# Patient Record
Sex: MALE | Race: ASIAN | NOT HISPANIC OR LATINO | URBAN - METROPOLITAN AREA
[De-identification: names, ages, dates, MRNs, and addresses within clinical notes are randomized per-mention and may not be internally consistent; named-entity substitution may affect disease eponyms.]

---

## 2020-02-10 ENCOUNTER — EMERGENCY (EMERGENCY)
Facility: HOSPITAL | Age: 60
LOS: 0 days | Discharge: HOME | End: 2020-02-10
Attending: EMERGENCY MEDICINE
Payer: MEDICAID

## 2020-02-10 VITALS
RESPIRATION RATE: 18 BRPM | HEIGHT: 66 IN | DIASTOLIC BLOOD PRESSURE: 51 MMHG | SYSTOLIC BLOOD PRESSURE: 118 MMHG | WEIGHT: 212.08 LBS | TEMPERATURE: 98 F | OXYGEN SATURATION: 99 % | HEART RATE: 88 BPM

## 2020-02-10 VITALS
OXYGEN SATURATION: 99 % | TEMPERATURE: 98 F | DIASTOLIC BLOOD PRESSURE: 63 MMHG | HEART RATE: 81 BPM | SYSTOLIC BLOOD PRESSURE: 117 MMHG | RESPIRATION RATE: 16 BRPM

## 2020-02-10 DIAGNOSIS — M79.671 PAIN IN RIGHT FOOT: ICD-10-CM

## 2020-02-10 PROCEDURE — 99283 EMERGENCY DEPT VISIT LOW MDM: CPT

## 2020-02-10 PROCEDURE — 73630 X-RAY EXAM OF FOOT: CPT | Mod: 26,RT

## 2020-02-10 RX ORDER — TRAMADOL HYDROCHLORIDE 50 MG/1
50 TABLET ORAL ONCE
Refills: 0 | Status: DISCONTINUED | OUTPATIENT
Start: 2020-02-10 | End: 2020-02-10

## 2020-02-10 RX ORDER — TRAMADOL HYDROCHLORIDE 50 MG/1
1 TABLET ORAL
Qty: 12 | Refills: 0
Start: 2020-02-10 | End: 2020-02-12

## 2020-02-10 RX ADMIN — TRAMADOL HYDROCHLORIDE 50 MILLIGRAM(S): 50 TABLET ORAL at 08:53

## 2020-02-10 NOTE — ED PROVIDER NOTE - RESPIRATORY NEGATIVE STATEMENT, MLM
Received pharmacy request for refill of primidone. No upcoming appt scheduled. Last visit 5/30/18 with instructions to return in 2 months, due 7/30/18.   Refilled per protocol x one month with instructions to call for appt.   primidone (MYSOLINE) 50 MG tablet 120 tablet 2 5/30/2018     Sig - Route: Take 1 tablet by mouth 4 times daily. - Oral    Class: Eprescribe        
no chest pain, no cough, and no shortness of breath.

## 2020-02-10 NOTE — ED ADULT NURSE REASSESSMENT NOTE - NS ED NURSE REASSESS COMMENT FT1
Pt able to ambulate safely and steadily w/out assistance, denies dizziness/weakness upon standing,pt discharged home and paperwork was signed, reviewed with patient. Vital Signs recorded in the EMR, pt given follow up instructions and discharge treatment plan. Pt education deemed successful at time of discharge after teach back proves proficiency. Pt has no distress at time of discharge, pt provided discharge instructions, follow up care and results reviewed by MD. Reinforced by the RN at time of discharge. Pt educated about usage of crutches, pt educated how to ambulate properly and effectively, how to avoid a fall and what to do about sitting/standing. Pt able to successfully demonstrate effective teach back which shows proficiency.

## 2020-02-10 NOTE — ED ADULT NURSE REASSESSMENT NOTE - NS ED NURSE REASSESS COMMENT FT1
Pt is resting in bed comfortably at this time, pt reassessed for pain and discomfort, pt shows improvement of symptoms, pt is in no notable distress at this time. pt safety maintained with bed rails up, call bell within reach, no-skid socks on the patient. Pt denies any complaints at this time. Plan of care explained, pt states understanding and was able to successfully teach back to show proficiency.

## 2020-02-10 NOTE — ED ADULT NURSE NOTE - OBJECTIVE STATEMENT
Pt is a 59YOM who is here for pain in his right foot, pt denies any recent injury or trauma to the foot, pt states that he woke up yesterday with pain and does not recall any injury, pt thought the pain would go away, but he woke up this morning and the pain got worse, pt does not have any noticeable deformity to the area. Pt has positive ROM and positive sensation in both bilateral lower extremities, pt is able to weight bare, but states that weight baring has caused him pain.

## 2020-02-10 NOTE — ED PROVIDER NOTE - PROGRESS NOTE DETAILS
Pt sts he is feeling much better now xray of right foot no acute fx which is reviewed with Dr. Vazquez radiologist. Pt is advised to follow up with the podiatrist and return if symptoms persist or worsen.

## 2020-02-10 NOTE — ED PROVIDER NOTE - PATIENT PORTAL LINK FT
You can access the FollowMyHealth Patient Portal offered by [facility] by registering at the following website: [email or web address]. By joining [facility]’s FollowMyHealth portal, you will also be able to view your health information using other applications (apps) compatible with our system.

## 2020-02-10 NOTE — ED PROVIDER NOTE - OBJECTIVE STATEMENT
59 years old male walked in with crutches c/o pain to the right heals two days ago after woke up in the morning. Pt denies recent hx of trauma to the right foot, headache, dizziness, blurred visions, light sensitivities, focal/distal weakness or numbness, cough, sob, chest pain, nausea, vomiting, fever, chills, abd pain, dysuria or irregular bowel movements.

## 2020-02-10 NOTE — ED PROVIDER NOTE - CONSTITUTIONAL, MLM
normal... Well appearing, awake, alert, oriented to person, place, time/situation and in no apparent distress. Speaking in clear full sentences no nasal flaring no shoulders retractions no diaphoresis

## 2025-03-18 NOTE — ED ADULT NURSE NOTE - BREATHING, MLM
non-toxic in appearance.       Assessment / Plan   Impression(s):  Danay was seen today for rash.    Diagnoses and all orders for this visit:    Rash and nonspecific skin eruption  -     triamcinolone (KENALOG) 0.1 % cream; Apply topically 2 times daily.    Discharged home.  Patient condition is good    Return if symptoms worsen or fail to improve.    Electronically signed by LEON Leal CNP   DD: 3/18/25    **This report was transcribed using voice recognition software. Every effort was made to ensure accuracy; however, inadvertent computerized transcription errors may be present.   
Spontaneous, unlabored and symmetrical